# Patient Record
Sex: FEMALE | Race: WHITE | NOT HISPANIC OR LATINO | Employment: OTHER | URBAN - NONMETROPOLITAN AREA
[De-identification: names, ages, dates, MRNs, and addresses within clinical notes are randomized per-mention and may not be internally consistent; named-entity substitution may affect disease eponyms.]

---

## 2018-10-16 ENCOUNTER — OFFICE VISIT (OUTPATIENT)
Dept: MEDICAL GROUP | Facility: PHYSICIAN GROUP | Age: 58
End: 2018-10-16
Payer: MEDICARE

## 2018-10-16 ENCOUNTER — HOSPITAL ENCOUNTER (OUTPATIENT)
Dept: LAB | Facility: MEDICAL CENTER | Age: 58
End: 2018-10-16
Attending: NURSE PRACTITIONER
Payer: MEDICARE

## 2018-10-16 VITALS
HEART RATE: 74 BPM | TEMPERATURE: 98.5 F | BODY MASS INDEX: 30.45 KG/M2 | WEIGHT: 194 LBS | DIASTOLIC BLOOD PRESSURE: 80 MMHG | OXYGEN SATURATION: 96 % | HEIGHT: 67 IN | SYSTOLIC BLOOD PRESSURE: 122 MMHG | RESPIRATION RATE: 16 BRPM

## 2018-10-16 DIAGNOSIS — R30.0 DYSURIA: ICD-10-CM

## 2018-10-16 DIAGNOSIS — N39.0 FREQUENT UTI: ICD-10-CM

## 2018-10-16 DIAGNOSIS — Z12.31 VISIT FOR SCREENING MAMMOGRAM: ICD-10-CM

## 2018-10-16 DIAGNOSIS — M32.9 SLE (SYSTEMIC LUPUS ERYTHEMATOSUS RELATED SYNDROME) (HCC): ICD-10-CM

## 2018-10-16 DIAGNOSIS — K21.9 GASTROESOPHAGEAL REFLUX DISEASE, ESOPHAGITIS PRESENCE NOT SPECIFIED: ICD-10-CM

## 2018-10-16 DIAGNOSIS — I45.81 LONG Q-T SYNDROME: ICD-10-CM

## 2018-10-16 DIAGNOSIS — E66.9 OBESITY (BMI 30-39.9): ICD-10-CM

## 2018-10-16 DIAGNOSIS — H90.5 HEARING LOSS, NEURAL: ICD-10-CM

## 2018-10-16 DIAGNOSIS — F33.1 MODERATE EPISODE OF RECURRENT MAJOR DEPRESSIVE DISORDER (HCC): ICD-10-CM

## 2018-10-16 DIAGNOSIS — E78.2 MIXED HYPERLIPIDEMIA: ICD-10-CM

## 2018-10-16 DIAGNOSIS — R42 VERTIGO: ICD-10-CM

## 2018-10-16 DIAGNOSIS — G60.9 IDIOPATHIC NEUROPATHY: ICD-10-CM

## 2018-10-16 PROBLEM — G54.0 BRACHIAL PLEXOPATHY: Status: ACTIVE | Noted: 2017-07-03

## 2018-10-16 PROBLEM — E87.6 HYPOKALEMIA: Status: ACTIVE | Noted: 2017-06-30

## 2018-10-16 PROBLEM — R44.9 SENSORY DEFICIT, RIGHT: Status: ACTIVE | Noted: 2017-08-15

## 2018-10-16 PROBLEM — R29.898 RIGHT HAND WEAKNESS: Status: ACTIVE | Noted: 2017-08-15

## 2018-10-16 PROBLEM — G47.33 OSA (OBSTRUCTIVE SLEEP APNEA): Status: ACTIVE | Noted: 2017-10-13

## 2018-10-16 LAB
BASOPHILS # BLD AUTO: 0.3 % (ref 0–1.8)
BASOPHILS # BLD: 0.01 K/UL (ref 0–0.12)
EOSINOPHIL # BLD AUTO: 0.03 K/UL (ref 0–0.51)
EOSINOPHIL NFR BLD: 1 % (ref 0–6.9)
ERYTHROCYTE [DISTWIDTH] IN BLOOD BY AUTOMATED COUNT: 46.7 FL (ref 35.9–50)
ERYTHROCYTE [SEDIMENTATION RATE] IN BLOOD BY WESTERGREN METHOD: 10 MM/HOUR (ref 0–30)
HCT VFR BLD AUTO: 45.5 % (ref 37–47)
HGB BLD-MCNC: 15.8 G/DL (ref 12–16)
IMM GRANULOCYTES # BLD AUTO: 0.01 K/UL (ref 0–0.11)
IMM GRANULOCYTES NFR BLD AUTO: 0.3 % (ref 0–0.9)
LYMPHOCYTES # BLD AUTO: 0.78 K/UL (ref 1–4.8)
LYMPHOCYTES NFR BLD: 25.9 % (ref 22–41)
MCH RBC QN AUTO: 34.1 PG (ref 27–33)
MCHC RBC AUTO-ENTMCNC: 34.7 G/DL (ref 33.6–35)
MCV RBC AUTO: 98.3 FL (ref 81.4–97.8)
MONOCYTES # BLD AUTO: 0.25 K/UL (ref 0–0.85)
MONOCYTES NFR BLD AUTO: 8.3 % (ref 0–13.4)
NEUTROPHILS # BLD AUTO: 1.93 K/UL (ref 2–7.15)
NEUTROPHILS NFR BLD: 64.2 % (ref 44–72)
NRBC # BLD AUTO: 0 K/UL
NRBC BLD-RTO: 0 /100 WBC
PLATELET # BLD AUTO: 136 K/UL (ref 164–446)
PMV BLD AUTO: 12.5 FL (ref 9–12.9)
RBC # BLD AUTO: 4.63 M/UL (ref 4.2–5.4)
WBC # BLD AUTO: 3 K/UL (ref 4.8–10.8)

## 2018-10-16 PROCEDURE — 36415 COLL VENOUS BLD VENIPUNCTURE: CPT

## 2018-10-16 PROCEDURE — 80053 COMPREHEN METABOLIC PANEL: CPT

## 2018-10-16 PROCEDURE — 99204 OFFICE O/P NEW MOD 45 MIN: CPT | Performed by: NURSE PRACTITIONER

## 2018-10-16 PROCEDURE — 85025 COMPLETE CBC W/AUTO DIFF WBC: CPT

## 2018-10-16 PROCEDURE — 85652 RBC SED RATE AUTOMATED: CPT

## 2018-10-16 PROCEDURE — 80061 LIPID PANEL: CPT

## 2018-10-16 RX ORDER — ESTRADIOL 0.1 MG/G
CREAM VAGINAL DAILY
COMMUNITY

## 2018-10-16 RX ORDER — METHENAMINE HIPPURATE 1000 MG/1
1 TABLET ORAL 2 TIMES DAILY
Qty: 60 TAB | Status: CANCELLED | OUTPATIENT
Start: 2018-10-16

## 2018-10-16 RX ORDER — ESOMEPRAZOLE MAGNESIUM 40 MG/1
40 GRANULE, DELAYED RELEASE ORAL
Qty: 30 EACH | Status: CANCELLED | OUTPATIENT
Start: 2018-10-16

## 2018-10-16 RX ORDER — DIPHENHYDRAMINE HCL 25 MG
25 TABLET ORAL EVERY 6 HOURS PRN
COMMUNITY

## 2018-10-16 RX ORDER — FLUTICASONE PROPIONATE 50 MCG
1 SPRAY, SUSPENSION (ML) NASAL DAILY
COMMUNITY

## 2018-10-16 RX ORDER — SIMVASTATIN 40 MG
40 TABLET ORAL NIGHTLY
COMMUNITY

## 2018-10-16 RX ORDER — PREDNISONE 2.5 MG/1
2.5 TABLET ORAL DAILY
COMMUNITY

## 2018-10-16 RX ORDER — ESOMEPRAZOLE MAGNESIUM 40 MG/1
40 CAPSULE, DELAYED RELEASE ORAL
Qty: 90 CAP | Refills: 1 | Status: SHIPPED | OUTPATIENT
Start: 2018-10-16

## 2018-10-16 RX ORDER — BUSPIRONE HYDROCHLORIDE 30 MG/1
30 TABLET ORAL 2 TIMES DAILY
Qty: 180 TAB | Refills: 1 | Status: SHIPPED | OUTPATIENT
Start: 2018-10-16

## 2018-10-16 RX ORDER — METHENAMINE HIPPURATE 1000 MG/1
1 TABLET ORAL 2 TIMES DAILY
Qty: 180 TAB | Refills: 1 | Status: SHIPPED | OUTPATIENT
Start: 2018-10-16

## 2018-10-16 RX ORDER — SPIRONOLACTONE 50 MG/1
50 TABLET, FILM COATED ORAL 2 TIMES DAILY
COMMUNITY

## 2018-10-16 RX ORDER — POTASSIUM CHLORIDE 1.5 G/1.58G
20 POWDER, FOR SOLUTION ORAL 2 TIMES DAILY
COMMUNITY

## 2018-10-16 RX ORDER — CLONAZEPAM 1 MG/1
1 TABLET ORAL 3 TIMES DAILY
Qty: 90 TAB | Refills: 1 | Status: SHIPPED
Start: 2018-10-16 | End: 2018-12-15

## 2018-10-16 RX ORDER — GABAPENTIN 600 MG/1
600 TABLET ORAL 3 TIMES DAILY
Qty: 270 TAB | Refills: 1 | Status: SHIPPED | OUTPATIENT
Start: 2018-10-16 | End: 2018-10-17 | Stop reason: SDUPTHER

## 2018-10-16 RX ORDER — CYCLOBENZAPRINE HCL 10 MG
10 TABLET ORAL 3 TIMES DAILY PRN
COMMUNITY

## 2018-10-16 RX ORDER — NYSTATIN 100000 U/G
CREAM TOPICAL 2 TIMES DAILY
COMMUNITY

## 2018-10-16 RX ORDER — METHENAMINE HIPPURATE 1000 MG/1
1 TABLET ORAL 2 TIMES DAILY
COMMUNITY
End: 2018-10-16 | Stop reason: SDUPTHER

## 2018-10-16 RX ORDER — GABAPENTIN 600 MG/1
600 TABLET ORAL 3 TIMES DAILY
Qty: 90 TAB | Status: CANCELLED | OUTPATIENT
Start: 2018-10-16

## 2018-10-16 RX ORDER — CHOLECALCIFEROL (VITAMIN D3) 125 MCG
CAPSULE ORAL
COMMUNITY

## 2018-10-16 RX ORDER — ESOMEPRAZOLE MAGNESIUM 40 MG/1
40 GRANULE, DELAYED RELEASE ORAL
COMMUNITY

## 2018-10-16 RX ORDER — BUSPIRONE HYDROCHLORIDE 30 MG/1
30 TABLET ORAL DAILY
COMMUNITY
End: 2018-10-16 | Stop reason: SDUPTHER

## 2018-10-16 RX ORDER — NITROFURANTOIN 25; 75 MG/1; MG/1
100 CAPSULE ORAL ONCE
Status: DISCONTINUED | OUTPATIENT
Start: 2018-10-16 | End: 2018-10-17

## 2018-10-16 RX ORDER — GABAPENTIN 600 MG/1
600 TABLET ORAL 3 TIMES DAILY
COMMUNITY
End: 2018-10-16 | Stop reason: SDUPTHER

## 2018-10-16 RX ORDER — MELOXICAM 15 MG/1
15 TABLET ORAL DAILY
COMMUNITY

## 2018-10-16 RX ORDER — IBUPROFEN 200 MG
200 TABLET ORAL EVERY 6 HOURS PRN
COMMUNITY

## 2018-10-16 RX ORDER — CLONAZEPAM 1 MG/1
0.5 TABLET ORAL 3 TIMES DAILY
Qty: 60 TAB | Status: CANCELLED | OUTPATIENT
Start: 2018-10-16

## 2018-10-16 RX ORDER — NITROFURANTOIN 25; 75 MG/1; MG/1
100 CAPSULE ORAL 2 TIMES DAILY
Qty: 10 CAP | Refills: 0 | Status: SHIPPED | OUTPATIENT
Start: 2018-10-16

## 2018-10-16 RX ORDER — BUSPIRONE HYDROCHLORIDE 30 MG/1
30 TABLET ORAL DAILY
Qty: 30 TAB | Status: CANCELLED | OUTPATIENT
Start: 2018-10-16

## 2018-10-16 RX ORDER — PREDNISONE 5 MG/1
5 TABLET ORAL 2 TIMES DAILY
COMMUNITY

## 2018-10-16 RX ORDER — ACETAMINOPHEN 325 MG/1
650 TABLET ORAL EVERY 4 HOURS PRN
COMMUNITY

## 2018-10-16 RX ORDER — CIPROFLOXACIN 500 MG/1
500 TABLET, FILM COATED ORAL 2 TIMES DAILY
COMMUNITY

## 2018-10-16 RX ORDER — CLONAZEPAM 1 MG/1
0.5 TABLET ORAL 3 TIMES DAILY
COMMUNITY
End: 2018-10-16 | Stop reason: SDUPTHER

## 2018-10-16 RX ORDER — COVID-19 ANTIGEN TEST
KIT MISCELLANEOUS
COMMUNITY

## 2018-10-16 RX ORDER — AZATHIOPRINE 50 MG/1
100 TABLET ORAL 2 TIMES DAILY
COMMUNITY

## 2018-10-16 NOTE — ASSESSMENT & PLAN NOTE
Pt had had burning with urination and urinary frequency, she does get frequent UTIs. Symptoms are very similar. She has been treated in the past with Cipro 500 mg twice daily for 10 days. Discussed with her the risks vs benefits of fluorquinolones, she is agreeable to taking Macrobid, but she would like to see urology as well.

## 2018-10-17 PROBLEM — F07.81 POST CONCUSSIVE SYNDROME: Status: ACTIVE | Noted: 2018-10-17

## 2018-10-17 PROBLEM — M79.7 FIBROMYALGIA: Status: ACTIVE | Noted: 2018-10-17

## 2018-10-17 PROBLEM — F33.1 MODERATE EPISODE OF RECURRENT MAJOR DEPRESSIVE DISORDER (HCC): Status: ACTIVE | Noted: 2018-10-17

## 2018-10-17 PROBLEM — K21.9 GASTROESOPHAGEAL REFLUX DISEASE: Status: ACTIVE | Noted: 2018-10-17

## 2018-10-17 LAB
ALBUMIN SERPL BCP-MCNC: 4.6 G/DL (ref 3.2–4.9)
ALBUMIN/GLOB SERPL: 1.7 G/DL
ALP SERPL-CCNC: 109 U/L (ref 30–99)
ALT SERPL-CCNC: 10 U/L (ref 2–50)
ANION GAP SERPL CALC-SCNC: 9 MMOL/L (ref 0–11.9)
AST SERPL-CCNC: 13 U/L (ref 12–45)
BILIRUB SERPL-MCNC: 0.8 MG/DL (ref 0.1–1.5)
BUN SERPL-MCNC: 30 MG/DL (ref 8–22)
CALCIUM SERPL-MCNC: 9.6 MG/DL (ref 8.5–10.5)
CHLORIDE SERPL-SCNC: 105 MMOL/L (ref 96–112)
CHOLEST SERPL-MCNC: 160 MG/DL (ref 100–199)
CO2 SERPL-SCNC: 28 MMOL/L (ref 20–33)
CREAT SERPL-MCNC: 0.79 MG/DL (ref 0.5–1.4)
FASTING STATUS PATIENT QL REPORTED: NORMAL
GLOBULIN SER CALC-MCNC: 2.7 G/DL (ref 1.9–3.5)
GLUCOSE SERPL-MCNC: 87 MG/DL (ref 65–99)
HDLC SERPL-MCNC: 52 MG/DL
LDLC SERPL CALC-MCNC: 87 MG/DL
POTASSIUM SERPL-SCNC: 4 MMOL/L (ref 3.6–5.5)
PROT SERPL-MCNC: 7.3 G/DL (ref 6–8.2)
SODIUM SERPL-SCNC: 142 MMOL/L (ref 135–145)
TRIGL SERPL-MCNC: 105 MG/DL (ref 0–149)

## 2018-10-17 RX ORDER — GABAPENTIN 600 MG/1
1200 TABLET ORAL 3 TIMES DAILY
Qty: 540 TAB | Refills: 1 | Status: SHIPPED | OUTPATIENT
Start: 2018-10-17

## 2018-10-17 NOTE — ASSESSMENT & PLAN NOTE
This is a chronic condition, stable. 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 She is on Buspar and clonazepam for associated s/sx, she needs refills.

## 2018-10-17 NOTE — ASSESSMENT & PLAN NOTE
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 She is requesting referral, this was placed for her.

## 2018-10-17 NOTE — ASSESSMENT & PLAN NOTE
This is chronic, stable and controlled with daily PPI, she does need refills, this was called in for her.

## 2018-10-17 NOTE — PROGRESS NOTES
Chief Complaint   Patient presents with   • Annual Exam     est care, UTI         This is a 58 y.o.female patient that presents today with the followin-year-old female patient presents today to establish care with new PCP and has multiple concerns and needs refills on multiple medications.    Dysuria  Pt had had burning with urination and urinary frequency, she does get frequent UTIs. Symptoms are very similar. She has been treated in the past with Cipro 500 mg twice daily for 10 days. Discussed with her the risks vs benefits of fluorquinolones, she is agreeable to taking Macrobid, but she would like to see urology as well.     Hearing loss, neural  Pt has neural hearing loss, was followed by neurology, though to be caused by certain meds and SLE    Mixed hyperlipidemia  This is chronic, due for labs, on statin    Idiopathic neuropathy  This is chronic, stable and controlled with daily gabapentin, 1200 mg three time daily. She needs refills, this was called in for her.    Frequent UTI  See additional notes. She has been followed by urology and is on daily methenamine, needs refills, this was called in for her.     SLE (systemic lupus erythematosus related syndrome) (HCC)  This is a chronic condition, stable. 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 She is on Buspar and clonazepam for associated s/sx, she needs refills.    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 She is requesting referral, this was placed for her.     Obesity (BMI 30-39.9)  This is chronic, she is on Qsymia, was prescribed by her last PCP at the [facility].     Gastroesophageal reflux disease  This is chronic, stable and controlled with daily PPI, she does need refills, this was called in for her.    Moderate episode of recurrent major depressive disorder (HCC)  This is chronic, stable on medications. She is  on Buspar and clonazepam--also take this for s/sx associated with SLE, hearing loss and vertigo      No results found for any previous visit.         clinical course has been stable    Past Medical History:   Diagnosis Date   • Anemia    • Brachial plexopathy    • Chronic back pain    • Depression    • GERD (gastroesophageal reflux disease)    • Hearing loss    • Hiatal hernia    • Insomnia    • ANYA (obstructive sleep apnea)    • Prolonged QT syndrome    • Ptosis of eyelid, right    • SLE (systemic lupus erythematosus) ([facility])        History reviewed. No pertinent surgical history.    History reviewed. No pertinent family history.    Azithromycin; Codeine; Compazine; Domperidone; Metoclopramide; and Plaquenil [hydroxychloroquine sulfate]    Current Outpatient Prescriptions Ordered in [facility]   Medication Sig Dispense Refill   • gabapentin (NEURONTIN) 600 MG tablet Take 2 Tabs by mouth 3 times a day. 540 Tab 1   • Vortioxetine HBr (TRINTELLIX) 20 MG Tab Take  by mouth.     • simvastatin (ZOCOR) 40 MG Tab Take 40 mg by mouth every evening.     • fluticasone (FLONASE) 50 MCG/ACT nasal spray Spray 1 Spray in nose every day.     • potassium chloride (KLOR-CON) 20 MEQ Pack Take 20 mEq by mouth 2 times a day.     • Fexofenadine-Pseudoephedrine (ALLEGRA-D 24 HOUR PO) Take  by mouth.     • Phentermine-Topiramate (QSYMIA) 15-92 MG CAPSULE SR 24 HR Take  by mouth.     • spironolactone (ALDACTONE) 50 MG Tab Take 50 mg by mouth 2 times a day.     • meloxicam (MOBIC) 15 MG tablet Take 15 mg by mouth every day.     • esomeprazole magnesium (NEXIUM) 40 MG Pack Take 40 mg by mouth every morning before breakfast.     • azaTHIOprine (IMURAN) 50 MG Tab Take 100 mg by mouth 2 Times a Day.     • Suvorexant (BELSOMRA) 20 MG Tab Take  by mouth.     • nystatin (MYCOSTATIN) 414490 UNIT/GM Cream topical cream Apply  to affected area(s) 2 times a day.     • estradiol (ESTRACE) 0.1 MG/GM vaginal cream Insert  in vagina every day.     • Melatonin 5  MG Tab Take  by mouth.     • Polyethylene Glycol 3350 (MIRALAX PO) Take  by mouth.     • Probiotic Product (ALIGN) Cap Take  by mouth.     • Casanthranol-Docusate Sodium ( PLUS PO) Take  by mouth.     • diphenhydrAMINE (BENADRYL) 25 MG Tab Take 25 mg by mouth every 6 hours as needed for Sleep.     • ibuprofen (MOTRIN) 200 MG Tab Take 200 mg by mouth every 6 hours as needed.     • asa/apap/caffeine (EXCEDRIN) 250-250-65 MG Tab Take 1 Tab by mouth every 6 hours as needed for Headache.     • Naproxen Sodium (ALEVE) 220 MG Cap Take  by mouth.     • acetaminophen (TYLENOL) 325 MG Tab Take 650 mg by mouth every four hours as needed.     • predniSONE (DELTASONE) 5 MG Tab Take 5 mg by mouth 2 times a day.     • prednisONE (DELTASONE) 2.5 MG Tab Take 2.5 mg by mouth every day.     • cyclobenzaprine (FLEXERIL) 10 MG Tab Take 10 mg by mouth 3 times a day as needed.     • ciprofloxacin (CIPRO) 500 MG Tab Take 500 mg by mouth 2 times a day.     • nitrofurantoin monohydr macro (MACROBID) 100 MG Cap Take 1 Cap by mouth 2 times a day. 10 Cap 0   • methenamine hip (HIPPREX) 1 GM Tab Take 1 Tab by mouth 2 times a day. 180 Tab 1   • clonazePAM (KLONOPIN) 1 MG Tab Take 1 Tab by mouth 3 times a day for 60 days. 90 Tab 1   • busPIRone (BUSPAR) 30 MG tablet Take 1 Tab by mouth 2 times a day. 180 Tab 1   • esomeprazole (NEXIUM) 40 MG delayed-release capsule Take 1 Cap by mouth every morning before breakfast. 90 Cap 1     Current Facility-Administered Medications Ordered in Epic   Medication Dose Route Frequency Provider Last Rate Last Dose   • nitrofurantoin monohydr macro (MACROBID) capsule CAPS 100 mg  100 mg Oral Once Christina Cerrato, A.P.R.N.           Constitutional ROS: No unexpected change in weight, No weakness, No unexplained fevers, sweats, or chills  Eye ROS: Positive for decreased vision due to Plaquenil use  Ear ROS: Positive for decreased hearing, bilaterally, sensorineural  Pulmonary ROS: No chronic cough, sputum, or  "hemoptysis, No shortness of breath, No recent change in breathing  Cardiovascular ROS: Positive per HPI  Gastrointestinal ROS: Positive for GERD  Musculoskeletal/Extremities ROS: Positive for fibromyalgia, chronic cervical and lumbar spine pain.  Positive for SLE  Neurologic ROS: Normal development, No seizures, No weakness, Positive for idiopathic peripheral neuropathy  Psychiatric ROS: Positive for depression  Genitourinary ROS: Positive per HPI    Physical exam:  /80 (BP Location: Right arm, Patient Position: Sitting, BP Cuff Size: Adult long)   Pulse 74   Temp 36.9 °C (98.5 °F) (Temporal)   Resp 16   Ht 1.702 m (5' 7\")   Wt 88 kg (194 lb)   SpO2 96%   BMI 30.38 kg/m²   General Appearance: Middle-aged older female, alert, no distress, obese, well-groomed  Skin: Skin color, texture, turgor normal. No rashes or lesions.  Lungs: negative findings: normal respiratory rate and rhythm, lungs clear to auscultation  Heart: negative. RRR without murmur, gallop, or rubs.  No ectopy.  Abdomen: Abdomen soft, non-tender. BS normal. No masses,  No organomegaly  Musculoskeletal: negative findings: no evidence of joint instability, no evidence of muscle atrophy, no deformities present  Neurologic: intact    Medical decision making/discussion: Labs have been ordered per her request for ongoing management of SLE and other chronic conditions.  Referrals have been placed to cardiology as well as rheumatology for ongoing care.  She is also been referred to urology for her frequent UTIs, she will be started on an antibiotic for presumed UTI secondary to symptoms.  Multiple medications have been refilled and she is to take them Z prescribed.  She is to follow-up with me in 4-6 weeks with labs done before visit.    Hailey was seen today for annual exam.    Diagnoses and all orders for this visit:    SLE (systemic lupus erythematosus related syndrome) (HCC)  -     REFERRAL TO RHEUMATOLOGY  -     CBC WITH DIFFERENTIAL; " Future  -     COMP METABOLIC PANEL; Future  -     WESTERGREN SED RATE; Future  -     clonazePAM (KLONOPIN) 1 MG Tab; Take 1 Tab by mouth 3 times a day for 60 days.  -     busPIRone (BUSPAR) 30 MG tablet; Take 1 Tab by mouth 2 times a day.    Dysuria  -     nitrofurantoin monohydr macro (MACROBID) capsule CAPS 100 mg; Take 1 Cap by mouth Once.      Frequent UTI  -     REFERRAL TO UROLOGY  -     methenamine hip (HIPPREX) 1 GM Tab; Take 1 Tab by mouth 2 times a day.    Visit for screening mammogram  -     MA-SCREEN MAMMO W/CAD-BILAT; Future    Mixed hyperlipidemia  -     LIPID PROFILE; Future    Long Q-T syndrome  -     REFERRAL TO CARDIOLOGY    Idiopathic neuropathy  a day.  -     gabapentin (NEURONTIN) 600 MG tablet; Take 2 Tabs by mouth 3 times a day.    Obesity (BMI 30-39.9)  -     Patient identified as having weight management issue.  Appropriate orders and counseling given.    Gastroesophageal reflux disease, esophagitis presence not specified  -     esomeprazole (NEXIUM) 40 MG delayed-release capsule; Take 1 Cap by mouth every morning before breakfast.    Vertigo  -     clonazePAM (KLONOPIN) 1 MG Tab; Take 1 Tab by mouth 3 times a day for 60 days.  -     busPIRone (BUSPAR) 30 MG tablet; Take 1 Tab by mouth 2 times a day.    Hearing loss, neural  -     clonazePAM (KLONOPIN) 1 MG Tab; Take 1 Tab by mouth 3 times a day for 60 days.  -     busPIRone (BUSPAR) 30 MG tablet; Take 1 Tab by mouth 2 times a day.    Please note that this dictation was created using voice recognition software. I have made every reasonable attempt to correct obvious errors, but I expect that there are errors of grammar and possibly content that I did not discover before finalizing the note.

## 2018-10-17 NOTE — ASSESSMENT & PLAN NOTE
This is chronic, stable and controlled with daily gabapentin, 1200 mg three time daily. She needs refills, this was called in for her.

## 2018-10-17 NOTE — ASSESSMENT & PLAN NOTE
See additional notes. She has been followed by urology and is on daily methenamine, needs refills, this was called in for her.

## 2018-10-17 NOTE — ASSESSMENT & PLAN NOTE
This is chronic, she is on Qsymia, was prescribed by her last PCP at the [facility].

## 2018-10-17 NOTE — ASSESSMENT & PLAN NOTE
This is chronic, stable on medications. She is on Buspar and clonazepam--also take this for s/sx associated with SLE, hearing loss and vertigo

## 2018-11-07 RX ORDER — FEXOFENADINE HCL AND PSEUDOEPHEDRINE HCI 60; 120 MG/1; MG/1
1 TABLET, EXTENDED RELEASE ORAL DAILY
Qty: 30 TAB | Refills: 5 | Status: SHIPPED | OUTPATIENT
Start: 2018-11-07

## 2018-11-12 DIAGNOSIS — I45.81 LONG Q-T SYNDROME: ICD-10-CM

## 2018-11-12 NOTE — TELEPHONE ENCOUNTER
----- Message from Hailey Burr sent at 11/11/2018 12:19 AM PST -----  Regarding: Prescription Question  Contact: [phone]  Dear Christina:  I was wondering if you would call [facility] with a prescription for me for Trintellix 20mg 1 tab at night?  My prescription from [place] runs out on the 15th and has no refills.  I have an appointment with Dr Rice on 20 November but I don't want to go that long without this prescription.    Thank you so much for your help,    Hailey Burr
